# Patient Record
Sex: MALE | Race: WHITE | NOT HISPANIC OR LATINO | Employment: FULL TIME | URBAN - METROPOLITAN AREA
[De-identification: names, ages, dates, MRNs, and addresses within clinical notes are randomized per-mention and may not be internally consistent; named-entity substitution may affect disease eponyms.]

---

## 2019-03-20 RX ORDER — ALPRAZOLAM 0.5 MG/1
TABLET ORAL
COMMUNITY
End: 2022-04-28

## 2019-03-20 RX ORDER — PHENOL/SODIUM PHENOLATE
AEROSOL, SPRAY (ML) MUCOUS MEMBRANE
COMMUNITY

## 2019-03-20 RX ORDER — MONTELUKAST SODIUM 10 MG/1
TABLET ORAL
COMMUNITY

## 2019-03-20 RX ORDER — ATAZANAVIR 150 MG/1
CAPSULE ORAL
COMMUNITY
End: 2021-04-15

## 2019-03-20 RX ORDER — EMTRICITABINE AND TENOFOVIR DISOPROXIL FUMARATE 200; 300 MG/1; MG/1
TABLET, FILM COATED ORAL
COMMUNITY
End: 2021-04-15 | Stop reason: ALTCHOICE

## 2019-04-08 ENCOUNTER — OFFICE VISIT (OUTPATIENT)
Dept: FAMILY MEDICINE | Facility: CLINIC | Age: 64
End: 2019-04-08
Payer: COMMERCIAL

## 2019-04-08 VITALS
HEIGHT: 65 IN | OXYGEN SATURATION: 97 % | BODY MASS INDEX: 22.66 KG/M2 | TEMPERATURE: 98 F | HEART RATE: 76 BPM | WEIGHT: 136 LBS | DIASTOLIC BLOOD PRESSURE: 79 MMHG | SYSTOLIC BLOOD PRESSURE: 122 MMHG | RESPIRATION RATE: 16 BRPM

## 2019-04-08 DIAGNOSIS — B20 HIV (HUMAN IMMUNODEFICIENCY VIRUS INFECTION): Primary | ICD-10-CM

## 2019-04-08 DIAGNOSIS — Z79.899 LONG TERM USE OF DRUG: ICD-10-CM

## 2019-04-08 DIAGNOSIS — E78.5 HYPERLIPIDEMIA, UNSPECIFIED HYPERLIPIDEMIA TYPE: ICD-10-CM

## 2019-04-08 PROCEDURE — 99213 OFFICE O/P EST LOW 20 MIN: CPT | Mod: S$GLB,,, | Performed by: NURSE PRACTITIONER

## 2019-04-08 PROCEDURE — 3008F PR BODY MASS INDEX (BMI) DOCUMENTED: ICD-10-PCS | Mod: CPTII,S$GLB,, | Performed by: NURSE PRACTITIONER

## 2019-04-08 PROCEDURE — 3008F BODY MASS INDEX DOCD: CPT | Mod: CPTII,S$GLB,, | Performed by: NURSE PRACTITIONER

## 2019-04-08 PROCEDURE — 99213 PR OFFICE/OUTPT VISIT, EST, LEVL III, 20-29 MIN: ICD-10-PCS | Mod: S$GLB,,, | Performed by: NURSE PRACTITIONER

## 2019-04-08 RX ORDER — FLUTICASONE FUROATE AND VILANTEROL 100; 25 UG/1; UG/1
POWDER RESPIRATORY (INHALATION)
COMMUNITY

## 2019-04-08 NOTE — ASSESSMENT & PLAN NOTE
Doing well on Genvoya. Excellent control back in 10/18. Continue current medications. Will repeat labs today as well as his family med labs w/ Dr. Hill. Continue to follow up as scheduled.  Will call him with results.

## 2019-04-08 NOTE — ASSESSMENT & PLAN NOTE
Discussed his lab values. States this is a good reading for him.     Controlled via diet. Continue to monitor w/ PCP;

## 2019-04-08 NOTE — PROGRESS NOTES
Subjective:      Patient ID: Ruiz Newton is a 63 y.o. male.    Chief Complaint: HIV Positive/AIDS (Yearly ID exam)      Here for follow-up on HIV. Doing well at this time. Tolerating Genvoya very well. Has not missed a dose. No adverse effects from this medicine. Cost has been much better than prior therapy. No recent fever/chills/chest pain/shortness of breath/nausea/vomiting/diarrhea.  Pt is scheduled to see PCP this month and will do his labs at that time.     No acute issues reported.       ROS:   Review of Systems   Constitutional: Negative.    HENT: Negative.    Eyes: Negative.    Respiratory: Negative.    Cardiovascular: Negative.    Gastrointestinal: Negative.    Endocrine: Negative.    Genitourinary: Negative.    Musculoskeletal: Negative.    Skin: Negative.    Allergic/Immunologic: Negative.    Neurological: Negative.    Hematological: Negative.    Psychiatric/Behavioral: Negative.    All other systems reviewed and are negative.    Objective:   Physical Exam   Constitutional: He is oriented to person, place, and time. He appears well-developed and well-nourished.   Eyes: Pupils are equal, round, and reactive to light. No scleral icterus.   Neck: Normal range of motion. Neck supple.   Cardiovascular: Normal rate and regular rhythm.   Pulmonary/Chest: Effort normal and breath sounds normal.   Abdominal: Soft. Bowel sounds are normal.   Musculoskeletal: Normal range of motion. He exhibits no edema.   Lymphadenopathy:     He has no cervical adenopathy.        Right: No supraclavicular adenopathy present.        Left: No supraclavicular adenopathy present.   Neurological: He is alert and oriented to person, place, and time.   Skin: Skin is warm and dry. Capillary refill takes less than 2 seconds.   Psychiatric: He has a normal mood and affect. His behavior is normal. Judgment and thought content normal.   Nursing note and vitals reviewed.    Assessment:     1. HIV (human immunodeficiency virus infection)     2. Long term use of drug    3. Hyperlipidemia, unspecified hyperlipidemia type      Plan:     Problem List Items Addressed This Visit        Cardiac/Vascular    Hyperlipemia    Overview     Controlled on diet.          Current Assessment & Plan     Discussed his lab values. States this is a good reading for him.     Controlled via diet. Continue to monitor w/ PCP;          Relevant Orders    CD4 T-[place] Cells    CBC auto differential    HIV RNA, quantitative, PCR    Comprehensive metabolic panel    RPR       ID    HIV (human immunodeficiency virus infection) - Primary    Overview     Controlled on Genvoya          Current Assessment & Plan     Doing well on Genvoya. Excellent control back in 10/18. Continue current medications. Will repeat labs today as well as his family med labs w/ Dr. Hill. Continue to follow up as scheduled.  Will call him with results.              Relevant Orders    CD4 T-[place] Cells    CBC auto differential    HIV RNA, quantitative, PCR    Comprehensive metabolic panel    RPR      Other Visit Diagnoses     Long term use of drug        Relevant Orders    CD4 T-[place] Cells    CBC auto differential    HIV RNA, quantitative, PCR    Comprehensive metabolic panel    RPR

## 2020-04-13 ENCOUNTER — OFFICE VISIT (OUTPATIENT)
Dept: FAMILY MEDICINE | Facility: CLINIC | Age: 65
End: 2020-04-13
Payer: COMMERCIAL

## 2020-04-13 DIAGNOSIS — B20 HIV INFECTION, UNSPECIFIED SYMPTOM STATUS: Primary | ICD-10-CM

## 2020-04-13 PROCEDURE — 99213 OFFICE O/P EST LOW 20 MIN: CPT | Mod: 95,,, | Performed by: NURSE PRACTITIONER

## 2020-04-13 PROCEDURE — 99213 PR OFFICE/OUTPT VISIT, EST, LEVL III, 20-29 MIN: ICD-10-PCS | Mod: 95,,, | Performed by: NURSE PRACTITIONER

## 2020-04-13 NOTE — ASSESSMENT & PLAN NOTE
Patient is doing well at this time.  He is 100% compliant with his medication.  No side effects from medication..     Given the current outbreak of COVID-19, the patient will be scheduled to have his labs done in May/ he was instructed to call the office 1 week prior to his lab draw for further instructions.     Once the labs are received, he will be notified via telephone unless there is something we can address over the phone.     0909 - received the labs drawn in October 2019.  CBC was unremarkable.  CMP unremarkable.  HIV RNA not done.  CD4 % 52.5.  Absolute lymphocytes 2736.

## 2020-04-13 NOTE — PROGRESS NOTES
Infectious Diseases Clinic Note    Subjective:       Patient ID: Ruiz Newton is a 64 y.o. male     Chief Complaint: No chief complaint on file.        Here for follow-up on HIV. Doing well at this time. Tolerating Genvoya very well. Has not missed a dose. No adverse effects from this medicine.  No recent fever/chills/chest pain/shortness of breath/nausea/vomiting/diarrhea.  States that he had labs done in October by his primary care provider, Dr. pat, but does not take HIV labs were done.      No acute issues reported.     The patient location is: home  The chief complaint leading to consultation is: HIV follow up   Visit type: Virtual visit with synchronous audio and video  Total time spent with patient: 10 min   Each patient to whom he or she provides medical services by telemedicine is:  (1) informed of the relationship between the physician and patient and the respective role of any other health care provider with respect to management of the patient; and (2) notified that he or she may decline to receive medical services by telemedicine and may withdraw from such care at any time.                 Past Medical History:   Diagnosis Date    Anxiety     Crohn disease     HIV (human immunodeficiency virus infection)        Social History     Socioeconomic History    Marital status: Significant Other     Spouse name: Not on file    Number of children: Not on file    Years of education: Not on file    Highest education level: Not on file   Occupational History    Not on file   Social Needs    Financial resource strain: Not on file    Food insecurity:     Worry: Not on file     Inability: Not on file    Transportation needs:     Medical: Not on file     Non-medical: Not on file   Tobacco Use    Smoking status: Former Smoker    Smokeless tobacco: Never Used   Substance and Sexual Activity    Alcohol use: No     Frequency: Never    Drug use: No    Sexual activity: Not on file   Lifestyle    Physical  activity:     Days per week: Not on file     Minutes per session: Not on file    Stress: Not on file   Relationships    Social connections:     Talks on phone: Not on file     Gets together: Not on file     Attends Buddhist service: Not on file     Active member of club or organization: Not on file     Attends meetings of clubs or organizations: Not on file     Relationship status: Not on file   Other Topics Concern    Not on file   Social History Narrative    Not on file         Current Outpatient Medications:     ALPRAZolam (XANAX) 0.5 MG tablet, alprazolam 0.5 mg tablet, Disp: , Rfl:     atazanavir (REYATAZ) 150 MG Cap, Reyataz 150 mg capsule  Take 2 capsules every day by oral route for 90 days., Disp: , Rfl:     elviteg-cob-emtri-tenof ALAFEN (GENVOYA) [phone] mg Tab, Genvoya 150 mg-150 mg-200 mg-10 mg tablet  TAKE 1 TABLET BY MOUTH ONCE DAILY, Disp: , Rfl:     emtricitabine-tenofovir 200-300 mg (TRUVADA) 200-300 mg Tab, Truvada 200 mg-300 mg tablet  Take 1 tablet every day by oral route for 90 days., Disp: , Rfl:     fluticasone-vilanterol (BREO) 100-25 mcg/dose diskus inhaler, Breo Ellipta 100 mcg-25 mcg/dose powder for inhalation, Disp: , Rfl:     montelukast (SINGULAIR) 10 mg tablet, montelukast 10 mg tablet, Disp: , Rfl:     omeprazole 20 mg TbEC, omeprazole 20 mg tablet,delayed release  Take 1 tablet every day by oral route for 90 days., Disp: , Rfl:     ritonavir (NORVIR) 100 mg Cap, Norvir 100 mg capsule  Take 1 capsule twice a day by oral route for 90 days., Disp: , Rfl:     Review of Systems   Constitutional: Negative for activity change, appetite change, chills, fatigue, fever and unexpected weight change.   HENT: Negative for congestion, dental problem, hearing loss, mouth sores, sore throat, trouble swallowing and voice change.    Eyes: Negative for pain, redness and visual disturbance.   Respiratory: Negative for cough, shortness of breath and wheezing.    Cardiovascular:  Negative for chest pain and leg swelling.   Gastrointestinal: Negative for abdominal distention, abdominal pain, diarrhea, nausea and vomiting.   Endocrine: Negative for polyuria.   Genitourinary: Negative for decreased urine volume, dysuria and frequency.   Musculoskeletal: Negative for joint swelling and neck stiffness.   Skin: Negative for rash.   Allergic/Immunologic: Negative for immunocompromised state.   Neurological: Negative for dizziness, weakness and headaches.   Hematological: Negative for adenopathy.   Psychiatric/Behavioral: Negative for agitation, confusion, hallucinations and suicidal ideas. The patient is not nervous/anxious.    All other systems reviewed and are negative.          Objective:      There were no vitals filed for this visit.  Physical Exam   Constitutional: He is oriented to person, place, and time. He appears well-developed and well-nourished.   Neurological: He is alert and oriented to person, place, and time.   Psychiatric: He has a normal mood and affect. His behavior is normal. Judgment and thought content normal.   Nursing note reviewed.      Assessment limited due to telemedicine visit.     Assessment/Plan:       1. HIV infection, unspecified symptom status      Problem List Items Addressed This Visit        ID    HIV (human immunodeficiency virus infection) - Primary    Overview     Controlled on Genvoya          Current Assessment & Plan     Patient is doing well at this time.  He is 100% compliant with his medication.  No side effects from medication..     Given the current outbreak of COVID-19, the patient will be scheduled to have his labs done in May/ he was instructed to call the office 1 week prior to his lab draw for further instructions.     Once the labs are received, he will be notified via telephone unless there is something we can address over the phone.     0909 - received the labs drawn in October 2019.  CBC was unremarkable.  CMP unremarkable.  HIV RNA not  done.  CD4 % 52.5.  Absolute lymphocytes 2736.         Relevant Orders    Comprehensive metabolic panel    CBC auto differential    Lipid panel    HIV RNA, quantitative, PCR    RPR (DX) with reflex to titer and confirmatory testing    CD4 T-[place] Cells        Labs reviewed after the encounter.  He will a address lipid, TSH, B12 with his PCP.     PSA 1.19      Follow up:     There are no Patient Instructions on file for this visit.     Follow up in about 6 months (around 10/13/2020), or if symptoms worsen or fail to improve.

## 2020-04-27 ENCOUNTER — PATIENT MESSAGE (OUTPATIENT)
Dept: FAMILY MEDICINE | Facility: CLINIC | Age: 65
End: 2020-04-27

## 2020-04-28 ENCOUNTER — PATIENT MESSAGE (OUTPATIENT)
Dept: FAMILY MEDICINE | Facility: CLINIC | Age: 65
End: 2020-04-28

## 2021-04-15 ENCOUNTER — OFFICE VISIT (OUTPATIENT)
Dept: FAMILY MEDICINE | Facility: CLINIC | Age: 66
End: 2021-04-15
Payer: MEDICARE

## 2021-04-15 VITALS — HEART RATE: 93 BPM | WEIGHT: 132 LBS | BODY MASS INDEX: 21.97 KG/M2 | OXYGEN SATURATION: 96 %

## 2021-04-15 DIAGNOSIS — B20 SYMPTOMATIC HIV INFECTION: ICD-10-CM

## 2021-04-15 DIAGNOSIS — E78.00 PURE HYPERCHOLESTEROLEMIA: ICD-10-CM

## 2021-04-15 PROCEDURE — 99213 PR OFFICE/OUTPT VISIT, EST, LEVL III, 20-29 MIN: ICD-10-PCS | Mod: S$GLB,,, | Performed by: INTERNAL MEDICINE

## 2021-04-15 PROCEDURE — 99213 OFFICE O/P EST LOW 20 MIN: CPT | Mod: S$GLB,,, | Performed by: INTERNAL MEDICINE

## 2022-04-04 ENCOUNTER — TELEPHONE (OUTPATIENT)
Dept: FAMILY MEDICINE | Facility: CLINIC | Age: 67
End: 2022-04-04
Payer: MEDICARE

## 2022-04-04 DIAGNOSIS — B20 HIV INFECTION, UNSPECIFIED SYMPTOM STATUS: Primary | ICD-10-CM

## 2022-04-04 DIAGNOSIS — E78.00 PURE HYPERCHOLESTEROLEMIA: ICD-10-CM

## 2022-04-04 DIAGNOSIS — B20 SYMPTOMATIC HIV INFECTION: ICD-10-CM

## 2022-04-04 NOTE — TELEPHONE ENCOUNTER
----- Message from Tamara Horton MA sent at 4/4/2022 10:46 AM CDT -----  Contact: self    ----- Message -----  From: Nallely Licona  Sent: 4/4/2022  10:14 AM CDT  To: [facility] Ng Staff    Requesting a call back regarding having lab work set up and also to reschedule upcoming appointment on 4/21-will be out of town then. Please call back at [phone]

## 2022-04-19 LAB
DATE/TIME: NORMAL
LAB PERSONNEL: NORMAL
PERSON NOTIFIED/TITLE: NORMAL
REASON FOR REJECTION: NORMAL
RESOLUTION: NORMAL
TEST UNABLE TO PERFORM: NORMAL

## 2022-04-22 LAB
% CD4 (HELPER CELLS): 53 % (ref 30–61)
ABSOLUTE CD4 + CELLS: 1732 CELLS/UL (ref 490–1740)
ABSOLUTE CD8+CELLS: 749 CELLS/UL (ref 180–1170)
ABSOLUTE LYMPHOCYTES: 3240 CELLS/UL (ref 850–3900)
CD4/CD8 RATIO: 2.31 (ref 0.86–5)
CD8 %: 23 % (ref 12–42)
COPIES/ML: NOT DETECTED COPIES/ML
LOG COPIES/ML: NOT DETECTED LOG COPIES/ML

## 2022-04-28 ENCOUNTER — OFFICE VISIT (OUTPATIENT)
Dept: FAMILY MEDICINE | Facility: CLINIC | Age: 67
End: 2022-04-28
Payer: MEDICARE

## 2022-04-28 VITALS
WEIGHT: 137 LBS | DIASTOLIC BLOOD PRESSURE: 73 MMHG | OXYGEN SATURATION: 99 % | SYSTOLIC BLOOD PRESSURE: 124 MMHG | BODY MASS INDEX: 22.8 KG/M2 | HEART RATE: 80 BPM

## 2022-04-28 DIAGNOSIS — B20 SYMPTOMATIC HIV INFECTION: ICD-10-CM

## 2022-04-28 DIAGNOSIS — E78.00 PURE HYPERCHOLESTEROLEMIA: ICD-10-CM

## 2022-04-28 PROCEDURE — 3078F PR MOST RECENT DIASTOLIC BLOOD PRESSURE < 80 MM HG: ICD-10-PCS | Mod: CPTII,S$GLB,, | Performed by: INTERNAL MEDICINE

## 2022-04-28 PROCEDURE — 1159F MED LIST DOCD IN RCRD: CPT | Mod: CPTII,S$GLB,, | Performed by: INTERNAL MEDICINE

## 2022-04-28 PROCEDURE — 1159F PR MEDICATION LIST DOCUMENTED IN MEDICAL RECORD: ICD-10-PCS | Mod: CPTII,S$GLB,, | Performed by: INTERNAL MEDICINE

## 2022-04-28 PROCEDURE — 3074F PR MOST RECENT SYSTOLIC BLOOD PRESSURE < 130 MM HG: ICD-10-PCS | Mod: CPTII,S$GLB,, | Performed by: INTERNAL MEDICINE

## 2022-04-28 PROCEDURE — 99213 PR OFFICE/OUTPT VISIT, EST, LEVL III, 20-29 MIN: ICD-10-PCS | Mod: S$GLB,,, | Performed by: INTERNAL MEDICINE

## 2022-04-28 PROCEDURE — 3078F DIAST BP <80 MM HG: CPT | Mod: CPTII,S$GLB,, | Performed by: INTERNAL MEDICINE

## 2022-04-28 PROCEDURE — 99213 OFFICE O/P EST LOW 20 MIN: CPT | Mod: S$GLB,,, | Performed by: INTERNAL MEDICINE

## 2022-04-28 PROCEDURE — 3008F PR BODY MASS INDEX (BMI) DOCUMENTED: ICD-10-PCS | Mod: CPTII,S$GLB,, | Performed by: INTERNAL MEDICINE

## 2022-04-28 PROCEDURE — 3074F SYST BP LT 130 MM HG: CPT | Mod: CPTII,S$GLB,, | Performed by: INTERNAL MEDICINE

## 2022-04-28 PROCEDURE — 1160F PR REVIEW ALL MEDS BY PRESCRIBER/CLIN PHARMACIST DOCUMENTED: ICD-10-PCS | Mod: CPTII,S$GLB,, | Performed by: INTERNAL MEDICINE

## 2022-04-28 PROCEDURE — 1160F RVW MEDS BY RX/DR IN RCRD: CPT | Mod: CPTII,S$GLB,, | Performed by: INTERNAL MEDICINE

## 2022-04-28 PROCEDURE — 3008F BODY MASS INDEX DOCD: CPT | Mod: CPTII,S$GLB,, | Performed by: INTERNAL MEDICINE

## 2022-04-28 RX ORDER — METOPROLOL SUCCINATE 50 MG/1
TABLET, EXTENDED RELEASE ORAL
COMMUNITY
Start: 2022-04-20

## 2022-04-28 NOTE — PROGRESS NOTES
Subjective:       Patient ID: Ruiz Newton is a 66 y.o. male.    Chief Complaint: Annual Exam (HIV ANNUAL)    Here for annual visit for HIV/HLP.  Doing well at this time.  Had COVID last year and has had some residual dyspnea on exertion that seems to be improving.  Also developed some SVT, was seen by PCP and started on metoprolol which seems to be controlling it.  No acute issues at this time.  100% compliant with Genvoya, no missed doses, no adverse effects.  Labs are reviewed and discussed, viral load remains undetectable, CD4 count >1700.    Review of Systems   Constitutional: Negative for chills and fever.   Respiratory: Negative for cough and shortness of breath.    Cardiovascular: Negative for chest pain and palpitations.   Gastrointestinal: Negative for diarrhea, nausea and vomiting.       Objective:      Physical Exam  Vitals and nursing note reviewed.   Constitutional:       Appearance: He is well-developed.   Cardiovascular:      Rate and Rhythm: Normal rate and regular rhythm.      Heart sounds: Normal heart sounds. No murmur heard.  Pulmonary:      Effort: Pulmonary effort is normal. No respiratory distress.      Breath sounds: Normal breath sounds. No wheezing.   Abdominal:      General: Bowel sounds are normal. There is no distension.      Palpations: Abdomen is soft.      Tenderness: There is no abdominal tenderness. There is no guarding.   Musculoskeletal:      Comments: Splint on R finger   Skin:     General: Skin is warm and dry.      Findings: No rash.   Neurological:      Mental Status: He is alert and oriented to person, place, and time. Mental status is at baseline.   Psychiatric:         Mood and Affect: Mood normal.         Behavior: Behavior normal.         Assessment:       1. Symptomatic HIV infection    2. Pure hypercholesterolemia        Plan:       Problem List Items Addressed This Visit        Cardiac/Vascular    Hyperlipemia     Diet controlled, monitor.              ID    HIV  (human immunodeficiency virus infection)     Excellent control, continue Genvoya, repeat labs one year.